# Patient Record
Sex: FEMALE | Race: WHITE | NOT HISPANIC OR LATINO | URBAN - METROPOLITAN AREA
[De-identification: names, ages, dates, MRNs, and addresses within clinical notes are randomized per-mention and may not be internally consistent; named-entity substitution may affect disease eponyms.]

---

## 2024-03-26 ENCOUNTER — TELEMEDICINE (OUTPATIENT)
Dept: BEHAVIORAL HEALTH | Facility: CLINIC | Age: 39
End: 2024-03-26
Payer: COMMERCIAL

## 2024-03-26 DIAGNOSIS — F33.42 RECURRENT MAJOR DEPRESSIVE DISORDER, IN FULL REMISSION (CMS-HCC): ICD-10-CM

## 2024-03-26 PROCEDURE — 99213 OFFICE O/P EST LOW 20 MIN: CPT | Performed by: PSYCHIATRY & NEUROLOGY

## 2024-03-26 RX ORDER — SERTRALINE HYDROCHLORIDE 100 MG/1
100 TABLET, FILM COATED ORAL DAILY
Qty: 30 TABLET | Refills: 3 | Status: SHIPPED | OUTPATIENT
Start: 2024-03-26 | End: 2024-07-24

## 2024-03-26 ASSESSMENT — ENCOUNTER SYMPTOMS: PSYCHIATRIC NEGATIVE: 1

## 2024-03-26 NOTE — ASSESSMENT & PLAN NOTE
Patient is just found out she is pregnant and she is down to 100 mg Zoloft daily.  Will manage her depression and anxiety similar to her first pregnancy, and most likely will be raising Zoloft to 150 mg immediately postpartum.  Will see patient again in 4 months

## 2024-03-26 NOTE — PROGRESS NOTES
Subjective   Patient ID: Marguerite Monreal is a 38 y.o. female who presents for med management visit.  HPI patient just found out she is pregnant for the second time.  Therefore she went down on the Zoloft from 150 mg down to 100 mg.  Says she has been feeling great on the 150 mg I reminded her that she felt very well during pregnancy with the first child.  (At the 100 mg dose) is on trying to breast-feed with this baby as well    Review of Systems   Psychiatric/Behavioral: Negative.         Objective   Physical Exam  Psychiatric:         Attention and Perception: Attention and perception normal.         Mood and Affect: Mood and affect normal.         Speech: Speech normal.         Behavior: Behavior normal. Behavior is cooperative.         Thought Content: Thought content normal.         Cognition and Memory: Cognition and memory normal.         Judgment: Judgment normal.      Comments:  glow of pregnancy         Assessment/Plan   Problem List Items Addressed This Visit             ICD-10-CM    Recurrent major depressive disorder, in full remission (CMS/HCC) F33.42     Patient is just found out she is pregnant and she is down to 100 mg Zoloft daily.  Will manage her depression and anxiety similar to her first pregnancy, and most likely will be raising Zoloft to 150 mg immediately postpartum.  Will see patient again in 4 months         Relevant Medications    sertraline (Zoloft) 100 mg tablet            Geovanny Carrera MD 03/26/24 1:45 PM

## 2024-07-16 DIAGNOSIS — F33.42 RECURRENT MAJOR DEPRESSIVE DISORDER, IN FULL REMISSION (CMS-HCC): ICD-10-CM

## 2024-07-16 RX ORDER — SERTRALINE HYDROCHLORIDE 100 MG/1
100 TABLET, FILM COATED ORAL DAILY
Qty: 90 TABLET | Refills: 3 | Status: SHIPPED | OUTPATIENT
Start: 2024-07-16 | End: 2025-07-11

## 2024-08-14 ENCOUNTER — APPOINTMENT (OUTPATIENT)
Dept: BEHAVIORAL HEALTH | Facility: CLINIC | Age: 39
End: 2024-08-14
Payer: COMMERCIAL

## 2024-08-14 DIAGNOSIS — F33.42 RECURRENT MAJOR DEPRESSIVE DISORDER, IN FULL REMISSION (CMS-HCC): ICD-10-CM

## 2024-08-14 PROCEDURE — 99213 OFFICE O/P EST LOW 20 MIN: CPT | Performed by: PSYCHIATRY & NEUROLOGY

## 2024-08-14 ASSESSMENT — ENCOUNTER SYMPTOMS: PSYCHIATRIC NEGATIVE: 1

## 2024-08-14 NOTE — ASSESSMENT & PLAN NOTE
Continue 100 mg/day Zoloft until delivery and then immediately postpartum increase dose to 150 mg Zoloft daily.  Follow-up in December

## 2024-08-14 NOTE — PROGRESS NOTES
Subjective   Patient ID: Marguerite Monreal is a 38 y.o. female who presents for med management visit in person.  HPI patient is now 6 months pregnant due in November.  She is doing well and is taking Zoloft 100 mg daily.  This is not her ideal dose of 150 mg is the ideal dose for her.  However we are following the same protocol as with her first pregnancy i.e. stay on 100 mg until postpartum and at that point go to 150.  Is not currently breast-feeding her 1-1/2-year-old.  Plans on breast-feeding the current child.    Review of Systems   Psychiatric/Behavioral: Negative.         Objective   Physical Exam  Psychiatric:         Attention and Perception: Attention and perception normal.         Mood and Affect: Mood and affect normal.         Speech: Speech normal.         Behavior: Behavior normal. Behavior is cooperative.         Thought Content: Thought content normal.         Cognition and Memory: Cognition and memory normal.         Judgment: Judgment normal.         Assessment/Plan   Problem List Items Addressed This Visit             ICD-10-CM    Recurrent major depressive disorder, in full remission (CMS-HCC) F33.42     Continue 100 mg/day Zoloft until delivery and then immediately postpartum increase dose to 150 mg Zoloft daily.  Follow-up in December                 Geovanny Carrera MD 08/14/24 5:44 PM